# Patient Record
Sex: FEMALE | Race: WHITE | NOT HISPANIC OR LATINO | URBAN - METROPOLITAN AREA
[De-identification: names, ages, dates, MRNs, and addresses within clinical notes are randomized per-mention and may not be internally consistent; named-entity substitution may affect disease eponyms.]

---

## 2020-07-13 ENCOUNTER — OFFICE VISIT (OUTPATIENT)
Dept: URBAN - METROPOLITAN AREA CLINIC 35 | Facility: CLINIC | Age: 50
End: 2020-07-13

## 2020-07-13 VITALS
WEIGHT: 173 LBS | SYSTOLIC BLOOD PRESSURE: 135 MMHG | HEIGHT: 68 IN | BODY MASS INDEX: 26.22 KG/M2 | DIASTOLIC BLOOD PRESSURE: 80 MMHG

## 2020-07-13 NOTE — EXAM-MIGRATED EXAMINATIONS
GENERAL APPEARANCE: - alert, in no acute distress, well developed, well nourished;   HEAD: - normocephalic, atraumatic;   EYES: - sclera anicteric bilaterally;   ORAL CAVITY: - mucosa moist;   THROAT: - clear;   NECK/THYROID: - neck supple, full range of motion, no cervical lymphadenopathy, no thyromegaly, trachea midline;   SKIN: - no suspicious lesions, warm and dry, no spider angiomata, palmar erythema or icterus, multiple small cherry red spots in abdomen;   HEART: - no murmurs, regular rate and rhythm, S1, S2 normal;   LUNGS: - clear to auscultation bilaterally, good air movement, no wheezes, rales, rhonchi;   ABDOMEN: - bowel sounds present, no masses palpable, no organomegaly , no rebound tenderness, soft, nontender, nondistended;   MUSCULOSKELETAL: - normal posture, normal gait and station;   EXTREMITIES: - no clubbing, cyanosis, or edema;   PSYCH: - cooperative with exam, mood/affect full range;

## 2020-07-13 NOTE — HPI-MIGRATED HPI
;     Abdominal Pain : 50 year old female patient present today for consult of ER visit for abdominal pain that has now subsided.   Patient admits abdominal pain that is located RUQ and is described as "someone is squeezing my organs".   Pain started July 7 post dinner, she consumed a chicken salad sandwich with plain chips. Patient developed right upper quadrant pain around 45 minutes later. Pain was steady and severe. She took Ibuprofen 800 mg twice. She admits going to work on July 8. She left work in afternoon and presented to the ER around 4:30 pm. Patient was sent home on Bentyl and Toradol and it helped the pain. Pain continue to improve and yesterday was the first day that she was pain free. Last dose of Bentyl and Toradol was Saturday night.  No N/V with pain. No fever she states although at the ER she was 100 degrees. No diarrhea. No J/DU/AS.   2 BMs per day, with normal stool. She denies melena, blood, or mucus in stool. No constipation.  Patient denies antibiotics within the last (3-6) months.   Patient admits [facility] ER visit on 7/8/2020 for abdominal pain CT ABD/Pelvis 7/8/2020: Postsurgical changes along the cecum may reflect sequela of prior appendectomy. Increased number of small mesenteric lymph nodes which are nonspecific and favored to be reactive and may be related to underlying enteritis. Mild periportal edema was described. GB was described as NL.  She had an appendectomy in her 30's.  WBC: 10.90, HGB: 12.3, HCT:38.1, MCV: 99.0. MCH: 31.9, PLT: 215.  LFT's NL  Lipase NL  Admits family history of stomach cancer (Grandparent). No colon cancer.  Denies a previous Colonoscopy/EGD. ;

## 2020-07-16 ENCOUNTER — TELEPHONE ENCOUNTER (OUTPATIENT)
Dept: URBAN - METROPOLITAN AREA CLINIC 35 | Facility: CLINIC | Age: 50
End: 2020-07-16

## 2020-07-24 LAB
ABSOLUTE BASOPHILS: 78
ABSOLUTE EOSINOPHILS: 62
ABSOLUTE LYMPHOCYTES: 2094
ABSOLUTE MONOCYTES: 392
ABSOLUTE NEUTROPHILS: 2974
ALBUMIN/GLOBULIN RATIO: 1.4
ALBUMIN: 4.3
ALKALINE PHOSPHATASE: 67
ALT: 25
AST: 18
BASOPHILS: 1.4
BILIRUBIN, DIRECT: 0.1
BILIRUBIN, INDIRECT: 0.3
BILIRUBIN, TOTAL: 0.4
EOSINOPHILS: 1.1
GLOBULIN: 3
HEMATOCRIT: 37.7
HEMOGLOBIN: 12.5
LYMPHOCYTES: 37.4
MCH: 30.9
MCHC: 33.2
MCV: 93.3
MONOCYTES: 7
MPV: 9.9
NEUTROPHILS: 53.1
PLATELET COUNT: 268
PROTEIN, TOTAL: 7.3
RDW: 12.4
RED BLOOD CELL COUNT: 4.04
WHITE BLOOD CELL COUNT: 5.6

## 2020-08-10 ENCOUNTER — OFFICE VISIT (OUTPATIENT)
Dept: URBAN - METROPOLITAN AREA CLINIC 35 | Facility: CLINIC | Age: 50
End: 2020-08-10

## 2020-08-10 NOTE — HPI-MIGRATED HPI
;   ;     Abdominal Pain : Patient presents today for follow up of abdominal pain and to review results. Patient admits/denies improvement of abdominal pain.     Last visit (7/13/2020) 50 year old female patient present today for consult of ER visit for abdominal pain that has now subsided.   Patient admits abdominal pain that is located RUQ and is described as "someone is squeezing my organs".   Pain started July 7 post dinner, she consumed a chicken salad sandwich with plain chips. Patient developed right upper quadrant pain around 45 minutes later. Pain was steady and severe. She took Ibuprofen 800 mg twice. She admits going to work on July 8. She left work in afternoon and presented to the ER around 4:30 pm. Patient was sent home on Bentyl and Toradol and it helped the pain. Pain continue to improve and yesterday was the first day that she was pain free. Last dose of Bentyl and Toradol was Saturday night.  No N/V with pain. No fever she states although at the ER she was 100 degrees. No diarrhea. No J/DU/AS.   2 BMs per day, with normal stool. She denies melena, blood, or mucus in stool. No constipation.  Patient denies antibiotics within the last (3-6) months.   Patient admits [facility] ER visit on 7/8/2020 for abdominal pain CT ABD/Pelvis 7/8/2020: Postsurgical changes along the cecum may reflect sequela of prior appendectomy. Increased number of small mesenteric lymph nodes which are nonspecific and favored to be reactive and may be related to underlying enteritis. Mild periportal edema was described. GB was described as NL.  She had an appendectomy in her 30's.  WBC: 10.90, HGB: 12.3, HCT:38.1, MCV: 99.0. MCH: 31.9, PLT: 215.  LFT's NL  Lipase NL  Admits family history of stomach cancer (Grandparent). No colon cancer.  Denies a previous Colonoscopy/EGD. ;   Colorectal Cancer Screening : Patient admits/denies a family history of colon polyps and cancers.  Patient currently admits __ BMs per day with ___ stools. Patient denies melena, blood, or mucus in stool.  No abdominal pain. No weight loss No CP, SOB or fever. No blood thinners. No sleep apnea.  ;

## 2020-08-12 ENCOUNTER — OFFICE VISIT (OUTPATIENT)
Dept: URBAN - METROPOLITAN AREA CLINIC 33 | Facility: CLINIC | Age: 50
End: 2020-08-12

## 2020-08-12 VITALS — HEIGHT: 68 IN | WEIGHT: 170 LBS | BODY MASS INDEX: 25.76 KG/M2

## 2020-08-12 RX ORDER — SODIUM, POTASSIUM,MAG SULFATES 17.5-3.13G
177 ML SOLUTION, RECONSTITUTED, ORAL ORAL ONCE
Qty: 1 KIT | Refills: 0 | OUTPATIENT
Start: 2020-08-12

## 2020-08-12 NOTE — HPI-MIGRATED HPI
;   ;     Abdominal Pain : Patient presents today for follow up of abdominal pain and to review results. Patient admits improvement of abdominal pain. Patient admits complete relief of abdominal pain since 07/12/20. Patient admits wanting to be counseled on Probiotics being included in her diet. I discussed with her the new recommendations for Probiotics by AGA; no evidence is available that supplementing Probiotics for general health is helpful. I think adding prebiotic foods to her diet will be more beneficial.   Labs obtained as detailed below. All NL  RUQ US:no acute abnormality in RUQ. Hyperechoic small right hepatic lesions that may reflect hemangiomas but findings were not specific. Recommendation given to repeat US in 6 months.    Last visit (7/13/2020) 50 year old female patient present today for consult of ER visit for abdominal pain that has now subsided.   Patient admits abdominal pain that is located RUQ and is described as "someone is squeezing my organs".   Pain started July 7 post dinner, she consumed a chicken salad sandwich with plain chips. Patient developed right upper quadrant pain around 45 minutes later. Pain was steady and severe. She took Ibuprofen 800 mg twice. She admits going to work on July 8. She left work in afternoon and presented to the ER around 4:30 pm. Patient was sent home on Bentyl and Toradol and it helped the pain. Pain continue to improve and yesterday was the first day that she was pain free. Last dose of Bentyl and Toradol was Saturday night.  No N/V with pain. No fever she states although at the ER she was 100 degrees. No diarrhea. No J/DU/AS.   2 BMs per day, with normal stool. She denies melena, blood, or mucus in stool. No constipation.  Patient denies antibiotics within the last (3-6) months.   Patient admits [facility] ER visit on 7/8/2020 for abdominal pain CT ABD/Pelvis 7/8/2020: Postsurgical changes along the cecum may reflect sequela of prior appendectomy. Increased number of small mesenteric lymph nodes which are nonspecific and favored to be reactive and may be related to underlying enteritis. Mild periportal edema was described. GB was described as NL.  She had an appendectomy in her 30's.  WBC: 10.90, HGB: 12.3, HCT:38.1, MCV: 99.0. MCH: 31.9, PLT: 215.  LFT's NL  Lipase NL  Admits family history of stomach cancer (Grandparent). No colon cancer.  Denies a previous Colonoscopy/EGD. ;   Colorectal Cancer Screening : Patient admits a family history of colon polyps and cancers.  Patient currently admits regular BM's. Patient denies melena, blood, or mucus in stool.  No abdominal pain. No weight loss No CP, SOB or fever. No blood thinners. No sleep apnea.  ;

## 2021-02-12 ENCOUNTER — TELEPHONE ENCOUNTER (OUTPATIENT)
Dept: URBAN - METROPOLITAN AREA CLINIC 35 | Facility: CLINIC | Age: 51
End: 2021-02-12

## 2021-03-25 ENCOUNTER — LAB OUTSIDE AN ENCOUNTER (OUTPATIENT)
Dept: URBAN - METROPOLITAN AREA CLINIC 13 | Facility: CLINIC | Age: 51
End: 2021-03-25

## 2021-03-25 ENCOUNTER — TELEPHONE ENCOUNTER (OUTPATIENT)
Dept: URBAN - METROPOLITAN AREA CLINIC 35 | Facility: CLINIC | Age: 51
End: 2021-03-25

## 2021-04-21 ENCOUNTER — OFFICE VISIT (OUTPATIENT)
Dept: URBAN - METROPOLITAN AREA CLINIC 35 | Facility: CLINIC | Age: 51
End: 2021-04-21

## 2021-04-21 ENCOUNTER — DASHBOARD ENCOUNTERS (OUTPATIENT)
Age: 51
End: 2021-04-21

## 2021-04-21 VITALS
DIASTOLIC BLOOD PRESSURE: 70 MMHG | HEART RATE: 88 BPM | WEIGHT: 168 LBS | SYSTOLIC BLOOD PRESSURE: 120 MMHG | OXYGEN SATURATION: 98 % | BODY MASS INDEX: 25.46 KG/M2 | HEIGHT: 68 IN

## 2021-04-21 PROBLEM — 300331000: Status: ACTIVE | Noted: 2020-08-12

## 2021-04-21 RX ORDER — SODIUM, POTASSIUM,MAG SULFATES 17.5-3.13G
177 ML SOLUTION, RECONSTITUTED, ORAL ORAL ONCE
Qty: 1 KIT | Refills: 0 | Status: ON HOLD | COMMUNITY
Start: 2020-08-12

## 2021-04-21 RX ORDER — SODIUM, POTASSIUM,MAG SULFATES 17.5-3.13G
177 ML SOLUTION, RECONSTITUTED, ORAL ORAL BID
Qty: 1 KIT | Refills: 0 | OUTPATIENT
Start: 2021-04-21

## 2021-04-21 NOTE — HPI-MIGRATED HPI
;   ;     Abdominal Pain : Patient presents today to review US of the abdomen completed on 4.9.2021 and to schedule screening colonoscopy  No abdominal pain at this times.   1-2 BMs per day, with normal stool.   US RUQ as documented below. Stable small liver lesions, likely hemangiomas  Last visit (8.12.2020) Patient presents today for follow up of abdominal pain and to review results. Patient admits improvement of abdominal pain. Patient admits complete relief of abdominal pain since 07/12/20. Patient admits wanting to be counseled on Probiotics being included in her diet. I discussed with her the new recommendations for Probiotics by AGA; no evidence is available that supplementing Probiotics for general health is helpful. I think adding prebiotic foods to her diet will be more beneficial.   Labs obtained as detailed below. All NL  RUQ US:no acute abnormality in RUQ. Hyperechoic small right hepatic lesions that may reflect hemangiomas but findings were not specific. Recommendation given to repeat US in 6 months.    ;   Colorectal Cancer Screening : Patient denies a family history of colon polyps and cancers.  Patient currently admits 1-2 BMs per day. Stools are normal without melena, blood, or mucus.   No abdominal pain. No weight loss No CP, SOB or fever. No blood thinners. No sleep apnea No cardiac or pulmonary issues ;

## 2021-06-04 ENCOUNTER — TELEPHONE ENCOUNTER (OUTPATIENT)
Dept: URBAN - METROPOLITAN AREA CLINIC 35 | Facility: CLINIC | Age: 51
End: 2021-06-04

## 2021-06-07 ENCOUNTER — OFFICE VISIT (OUTPATIENT)
Dept: URBAN - METROPOLITAN AREA SURGERY CENTER 8 | Facility: SURGERY CENTER | Age: 51
End: 2021-06-07

## 2021-06-12 ENCOUNTER — TELEPHONE ENCOUNTER (OUTPATIENT)
Dept: URBAN - METROPOLITAN AREA CLINIC 35 | Facility: CLINIC | Age: 51
End: 2021-06-12

## 2021-06-29 ENCOUNTER — OFFICE VISIT (OUTPATIENT)
Dept: URBAN - METROPOLITAN AREA CLINIC 35 | Facility: CLINIC | Age: 51
End: 2021-06-29